# Patient Record
Sex: FEMALE
[De-identification: names, ages, dates, MRNs, and addresses within clinical notes are randomized per-mention and may not be internally consistent; named-entity substitution may affect disease eponyms.]

---

## 2024-07-12 ENCOUNTER — DASHBOARD ENCOUNTERS (OUTPATIENT)
Age: 34
End: 2024-07-12

## 2024-07-12 ENCOUNTER — OFFICE VISIT (OUTPATIENT)
Dept: URBAN - METROPOLITAN AREA CLINIC 12 | Facility: CLINIC | Age: 34
End: 2024-07-12
Payer: COMMERCIAL

## 2024-07-12 ENCOUNTER — LAB OUTSIDE AN ENCOUNTER (OUTPATIENT)
Dept: URBAN - METROPOLITAN AREA CLINIC 12 | Facility: CLINIC | Age: 34
End: 2024-07-12

## 2024-07-12 VITALS
HEIGHT: 66 IN | HEART RATE: 81 BPM | DIASTOLIC BLOOD PRESSURE: 75 MMHG | TEMPERATURE: 97.7 F | SYSTOLIC BLOOD PRESSURE: 113 MMHG | BODY MASS INDEX: 33.07 KG/M2 | WEIGHT: 205.8 LBS

## 2024-07-12 DIAGNOSIS — R10.11 RUQ ABDOMINAL PAIN: ICD-10-CM

## 2024-07-12 PROCEDURE — 99203 OFFICE O/P NEW LOW 30 MIN: CPT

## 2024-07-12 RX ORDER — DULOXETINE 60 MG/1
1 CAPSULE CAPSULE, DELAYED RELEASE PELLETS ORAL TWICE A DAY
Status: ACTIVE | COMMUNITY

## 2024-07-12 RX ORDER — GABAPENTIN 300 MG/1
1 CAPSULE CAPSULE ORAL AT NIGHT
Status: ACTIVE | COMMUNITY

## 2024-07-12 RX ORDER — HYDROXYZINE HYDROCHLORIDE 50 MG/1
1 TABLET AS NEEDED TABLET, FILM COATED ORAL AT NIGHT
Status: ACTIVE | COMMUNITY

## 2024-07-12 NOTE — HPI-TODAY'S VISIT:
Pt is a 34 yo female presents today for constant RUQ pain since last Wednesday. Reports sudden onset of RUQ pressure/squeezing pain last Tuesday which subsided for a few hours then return on Wednesday. It has been constant since then. Does not feel it's worsening or improving. Describes it as a dull 5/10 pain. Went to urgent care last Friday and had negative UA, abd xray and pregancy test done. Feels more gassy and bloated. Pain does not get aggravated by eating, certain food or activity. Tolerating diet w/o problem. Denies heartburn, reflux, epigastric pain, indigestion, or changes in bowel habits. States her mom had gallbladder issues and had cholecystectomy.

## 2024-07-19 ENCOUNTER — TELEPHONE ENCOUNTER (OUTPATIENT)
Dept: URBAN - METROPOLITAN AREA CLINIC 6 | Facility: CLINIC | Age: 34
End: 2024-07-19

## 2024-07-19 LAB
A/G RATIO: 1.7
ABSOLUTE BASOPHILS: 53
ABSOLUTE EOSINOPHILS: 312
ABSOLUTE LYMPHOCYTES: 2394
ABSOLUTE MONOCYTES: 524
ABSOLUTE NEUTROPHILS: 4317
ALBUMIN: 4
ALKALINE PHOSPHATASE: 66
ALT (SGPT): 12
AST (SGOT): 13
BASOPHILS: 0.7
BILIRUBIN, TOTAL: 0.5
BUN/CREATININE RATIO: (no result)
BUN: 15
CALCIUM: 9.1
CARBON DIOXIDE, TOTAL: 27
CHLORIDE: 104
CREATININE: 0.84
EGFR: 93
EOSINOPHILS: 4.1
GLOBULIN, TOTAL: 2.3
GLUCOSE: 95
HEMATOCRIT: 42.9
HEMOGLOBIN: 14.6
LIPASE: 69
LYMPHOCYTES: 31.5
MCH: 29.2
MCHC: 34
MCV: 85.8
MONOCYTES: 6.9
MPV: 11.9
NEUTROPHILS: 56.8
PLATELET COUNT: 231
POTASSIUM: 4.7
PROTEIN, TOTAL: 6.3
RDW: 12.4
RED BLOOD CELL COUNT: 5
SODIUM: 138
WHITE BLOOD CELL COUNT: 7.6